# Patient Record
Sex: FEMALE | Race: WHITE | NOT HISPANIC OR LATINO | URBAN - METROPOLITAN AREA
[De-identification: names, ages, dates, MRNs, and addresses within clinical notes are randomized per-mention and may not be internally consistent; named-entity substitution may affect disease eponyms.]

---

## 2022-09-09 ENCOUNTER — OFFICE VISIT (OUTPATIENT)
Dept: URBAN - METROPOLITAN AREA CLINIC 71 | Facility: CLINIC | Age: 27
End: 2022-09-09
Payer: COMMERCIAL

## 2022-09-09 DIAGNOSIS — H10.45 OTHER CHRONIC ALLERGIC CONJUNCTIVITIS: Primary | ICD-10-CM

## 2022-09-09 DIAGNOSIS — H16.213 EXPOSURE KERATOCONJUNCTIVITIS, BILATERAL: ICD-10-CM

## 2022-09-09 PROCEDURE — 92002 INTRM OPH EXAM NEW PATIENT: CPT | Performed by: OPTOMETRIST

## 2022-09-09 RX ORDER — PREDNISOLONE ACETATE 10 MG/ML
1 % SUSPENSION/ DROPS OPHTHALMIC
Qty: 5 | Refills: 0 | Status: ACTIVE
Start: 2022-09-09

## 2022-09-09 ASSESSMENT — INTRAOCULAR PRESSURE
OD: 17
OS: 18

## 2022-09-09 NOTE — IMPRESSION/PLAN
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 incomplete blink OS>OD. Plan: Discussed with pt. Blink more frequently and thoroughly, especially during extended near/computer work. Use lubricating drops up to QID OU for comfort. Recommend Refresh Optive Advanced. Discussed punctal plugs VS Restasis/Xiidra if symptoms do not resolve or if they worsen. Pt to call if symptoms worsen and she would like to try punctal plugs.

## 2022-09-09 NOTE — IMPRESSION/PLAN
Impression: Other chronic allergic conjunctivitis: H10.45. Bilateral. Pt using Opcon-A Plan: Discussed. Advise the use of Ketotifen/Olopatadine/Alcaftadine QD-BID OU PRN. Allergy drop handout given to pt. Informed pt that Cliff Marinoo is okay to use on occasion, but not recommended for long term or frequent use. START Prednisolone 1% TID OU x1 week, then taper weekly. Glaucoma precautions given. Rx sent to pharmacy. Call if symptoms do not resolve or if worsens.

## 2022-10-17 ENCOUNTER — OFFICE VISIT (OUTPATIENT)
Dept: URBAN - METROPOLITAN AREA CLINIC 71 | Facility: CLINIC | Age: 27
End: 2022-10-17
Payer: COMMERCIAL

## 2022-10-17 DIAGNOSIS — H10.45 OTHER CHRONIC ALLERGIC CONJUNCTIVITIS: Primary | ICD-10-CM

## 2022-10-17 DIAGNOSIS — H16.213 EXPOSURE KERATOCONJUNCTIVITIS, BILATERAL: ICD-10-CM

## 2022-10-17 PROCEDURE — 92014 COMPRE OPH EXAM EST PT 1/>: CPT | Performed by: OPTOMETRIST

## 2022-10-17 ASSESSMENT — INTRAOCULAR PRESSURE
OS: 18
OD: 17

## 2022-10-17 ASSESSMENT — KERATOMETRY
OD: 45.00
OS: 44.50

## 2022-10-17 NOTE — IMPRESSION/PLAN
Impression: Exposure keratoconjunctivitis, bilateral: X26.561. Still incomplete blink OS>OD. Symptoms improved. Plan: Discussed. Reminded pt to concentrate on blinking more frequently and thoroughly, especially during extended near/computer work. Discussed blinking exercises. Continue to use Refresh Optive Advanced. Again discussed punctal plugs VS Restasis/Xiidra if symptoms do not resolve or if they worsen. Pt to call if symptoms worsen and she would like to try punctal plugs.  Recommend yearly DE.

## 2022-10-17 NOTE — IMPRESSION/PLAN
Impression: Other chronic allergic conjunctivitis: H10.45. Bilateral. Pt stopped Prednisolone after 1 week, symptoms resolved. Plan: Discussed. Continue the use of Ketotifen/Olopatadine/Alcaftadine QD-BID OU PRN. Call with concerns or if symptoms worsen.

## 2022-11-28 ENCOUNTER — OFFICE VISIT (OUTPATIENT)
Dept: URBAN - METROPOLITAN AREA CLINIC 71 | Facility: CLINIC | Age: 27
End: 2022-11-28
Payer: COMMERCIAL

## 2022-11-28 DIAGNOSIS — H16.213 EXPOSURE KERATOCONJUNCTIVITIS, BILATERAL: Primary | ICD-10-CM

## 2022-11-28 RX ORDER — CARBOXYMETHYLCELLULOSE SODIUM 10 MG/ML
1 % SOLUTION/ DROPS OPHTHALMIC
Qty: 15 | Refills: 0 | Status: ACTIVE
Start: 2022-11-28

## 2022-11-28 ASSESSMENT — INTRAOCULAR PRESSURE
OS: 20
OD: 19

## 2022-12-01 ENCOUNTER — OFFICE VISIT (OUTPATIENT)
Dept: URBAN - METROPOLITAN AREA CLINIC 71 | Facility: CLINIC | Age: 27
End: 2022-12-01
Payer: COMMERCIAL

## 2022-12-01 DIAGNOSIS — H52.02 HYPERMETROPIA, LEFT EYE: ICD-10-CM

## 2022-12-01 DIAGNOSIS — H52.11 MYOPIA, RIGHT EYE: Primary | ICD-10-CM

## 2022-12-01 DIAGNOSIS — H52.13 MYOPIA, BILATERAL: ICD-10-CM

## 2022-12-01 PROCEDURE — 92012 INTRM OPH EXAM EST PATIENT: CPT | Performed by: OPTOMETRIST

## 2022-12-01 ASSESSMENT — VISUAL ACUITY
OS: 20/20
OD: 20/20

## 2022-12-01 ASSESSMENT — INTRAOCULAR PRESSURE
OD: 23
OS: 21

## 2022-12-01 NOTE — IMPRESSION/PLAN
Impression: Exposure keratoconjunctivitis, bilateral: L22.958. Still incomplete blink OS>OD. Plugs in place BLL, symptoms improved. Plan: Discussed. Pt to call if symptoms worsen.

## 2022-12-01 NOTE — IMPRESSION/PLAN
Impression: Myopia, right eye: H52.11. Plan: A glasses prescription has been discussed and generated. Opposite signs intentional.  Patient to call with any concerns.

## 2022-12-12 ENCOUNTER — TESTING ONLY (OUTPATIENT)
Dept: URBAN - METROPOLITAN AREA CLINIC 71 | Facility: CLINIC | Age: 27
End: 2022-12-12
Payer: COMMERCIAL

## 2022-12-12 DIAGNOSIS — H16.213 EXPOSURE KERATOCONJUNCTIVITIS, BILATERAL: Primary | ICD-10-CM

## 2022-12-12 PROCEDURE — 68761 CLOSE TEAR DUCT OPENING: CPT | Performed by: OPTOMETRIST

## 2022-12-12 ASSESSMENT — INTRAOCULAR PRESSURE
OS: 20
OD: 21

## 2023-01-09 ENCOUNTER — TESTING ONLY (OUTPATIENT)
Dept: URBAN - METROPOLITAN AREA CLINIC 71 | Facility: CLINIC | Age: 28
End: 2023-01-09
Payer: COMMERCIAL

## 2023-01-09 DIAGNOSIS — H16.213 EXPOSURE KERATOCONJUNCTIVITIS, BILATERAL: Primary | ICD-10-CM

## 2023-01-09 PROCEDURE — 68761 CLOSE TEAR DUCT OPENING: CPT | Performed by: OPTOMETRIST

## 2023-01-09 ASSESSMENT — INTRAOCULAR PRESSURE
OS: 17
OD: 20